# Patient Record
Sex: FEMALE | Race: WHITE | Employment: OTHER | URBAN - NONMETROPOLITAN AREA
[De-identification: names, ages, dates, MRNs, and addresses within clinical notes are randomized per-mention and may not be internally consistent; named-entity substitution may affect disease eponyms.]

---

## 2018-02-13 ENCOUNTER — HOSPITAL ENCOUNTER (EMERGENCY)
Age: 53
Discharge: HOME | End: 2018-02-13
Payer: MEDICARE

## 2018-02-13 ENCOUNTER — APPOINTMENT (OUTPATIENT)
Dept: GENERAL RADIOLOGY | Age: 53
End: 2018-02-13
Payer: MEDICARE

## 2018-02-13 VITALS
WEIGHT: 132 LBS | OXYGEN SATURATION: 99 % | TEMPERATURE: 97.8 F | SYSTOLIC BLOOD PRESSURE: 120 MMHG | RESPIRATION RATE: 20 BRPM | HEART RATE: 83 BPM | DIASTOLIC BLOOD PRESSURE: 71 MMHG

## 2018-02-13 DIAGNOSIS — S60.222A CONTUSION OF LEFT HAND, INITIAL ENCOUNTER: Primary | ICD-10-CM

## 2018-02-13 PROCEDURE — 73130 X-RAY EXAM OF HAND: CPT

## 2018-02-13 PROCEDURE — 99283 EMERGENCY DEPT VISIT LOW MDM: CPT

## 2018-02-13 PROCEDURE — 6370000000 HC RX 637 (ALT 250 FOR IP): Performed by: PHYSICIAN ASSISTANT

## 2018-02-13 RX ORDER — TRAMADOL HYDROCHLORIDE 50 MG/1
50 TABLET ORAL ONCE
Status: COMPLETED | OUTPATIENT
Start: 2018-02-13 | End: 2018-02-13

## 2018-02-13 RX ORDER — IBUPROFEN 600 MG/1
600 TABLET ORAL EVERY 6 HOURS PRN
Qty: 30 TABLET | Refills: 0 | Status: SHIPPED | OUTPATIENT
Start: 2018-02-13

## 2018-02-13 RX ADMIN — TRAMADOL HYDROCHLORIDE 50 MG: 50 TABLET, FILM COATED ORAL at 13:26

## 2018-02-13 ASSESSMENT — ENCOUNTER SYMPTOMS
DIARRHEA: 0
EYE REDNESS: 0
SHORTNESS OF BREATH: 0
EYE DISCHARGE: 0
WHEEZING: 0
NAUSEA: 0
SORE THROAT: 0
VOMITING: 0
RHINORRHEA: 0
COLOR CHANGE: 0
BACK PAIN: 0
COUGH: 0
CONSTIPATION: 0
ABDOMINAL PAIN: 0

## 2018-02-13 ASSESSMENT — PAIN SCALES - GENERAL: PAINLEVEL_OUTOF10: 7

## 2018-02-13 ASSESSMENT — PAIN DESCRIPTION - PAIN TYPE: TYPE: ACUTE PAIN

## 2018-02-13 ASSESSMENT — PAIN DESCRIPTION - LOCATION: LOCATION: HAND

## 2018-02-13 ASSESSMENT — PAIN DESCRIPTION - ORIENTATION: ORIENTATION: LEFT

## 2018-02-13 ASSESSMENT — PAIN DESCRIPTION - DESCRIPTORS: DESCRIPTORS: THROBBING

## 2018-02-13 NOTE — ED TRIAGE NOTES
Patient presents to the ED with left hand injury that happened last night. Patient states that a freezer door fell off and hit her hand. Hand is swollen and is very painful.

## 2018-02-13 NOTE — ED PROVIDER NOTES
[facility] EMERGENCY DEPT      CHIEF COMPLAINT       Chief Complaint   Patient presents with    Hand Injury     left       Nurses Notes reviewed and I agree except as noted in the HPI. HISTORY OF PRESENT ILLNESS    Anne Mccoy is a 46 y.o. female who presents to the ED for evaluation of a left hand injury. The patient reports that last night, a freezer door fell onto her left hand. She states that this morning when she woke up, her left hand was bruised and swollen. She rates her pain at a 7/10 in severity and describes it as throbbing in character. She states that nothing makes her pain better or worse. She denies any numbness or weakness of her LUE, but she does report tingling in her third, fourth, and fifth fingers. She denies any left wrist pain, bruising, or swelling. She denies any additional injuries or areas of pain. Patient denies any further complaints at initial encounter. REVIEW OF SYSTEMS     Review of Systems   Constitutional: Negative for chills, fatigue and fever. HENT: Negative for congestion, ear pain, rhinorrhea and sore throat. Eyes: Negative for discharge and redness. Respiratory: Negative for cough, shortness of breath and wheezing. Cardiovascular: Negative for chest pain and palpitations. Gastrointestinal: Negative for abdominal pain, constipation, diarrhea, nausea and vomiting. Genitourinary: Negative for difficulty urinating, dysuria and vaginal discharge. Musculoskeletal: Positive for arthralgias (left hand) and joint swelling (left hand). Negative for back pain, myalgias, neck pain and neck stiffness. Skin: Negative for color change, pallor, rash and wound. Neurological: Negative for dizziness, syncope, weakness, light-headedness, numbness and headaches. Hematological: Negative for adenopathy. Psychiatric/Behavioral: Negative for agitation, confusion, dysphoric mood and suicidal ideas. The patient is not nervous/anxious.          PAST MEDICAL HISTORY    has a past medical history of Asthma; Bone disease; and Bronchitis, chronic. SURGICAL HISTORY      has a past surgical history that includes Hysterectomy. CURRENT MEDICATIONS       Previous Medications    ETODOLAC (LODINE XL) 400 MG SR TABLET    Take 1 tablet by mouth daily. OXYCODONE-ACETAMINOPHEN (PERCOCET) 5-325 MG PER TABLET    Take 1 tablet by mouth every 4 hours as needed. ALLERGIES     has No Known Allergies. FAMILY HISTORY     has no family status information on file. family history is not on file. SOCIAL HISTORY      reports that she has been smoking. She does not have any smokeless tobacco history on file. She reports that she drinks alcohol. She reports that she does not use drugs. PHYSICAL EXAM     INITIAL VITALS:  weight is 132 lb (59.9 kg). Her oral temperature is 97.8 °F (36.6 °C). Her blood pressure is 120/71 and her pulse is 83. Her respiration is 20 and oxygen saturation is 99%. Physical Exam   Constitutional: She is oriented to person, place, and time. She appears well-developed and well-nourished. No distress. HENT:   Head: Normocephalic and atraumatic. Right Ear: External ear normal.   Left Ear: External ear normal.   Nose: Nose normal.   Mouth/Throat: Oropharynx is clear and moist.   Eyes: Conjunctivae and EOM are normal. Pupils are equal, round, and reactive to light. Right eye exhibits no discharge. Left eye exhibits no discharge. No scleral icterus. Neck: Normal range of motion. Neck supple. Cardiovascular: Normal rate, regular rhythm, normal heart sounds and intact distal pulses. Exam reveals no gallop and no friction rub. No murmur heard. Pulses:       Radial pulses are 2+ on the right side, and 2+ on the left side. Capillary refill is less than 3 seconds. Pulmonary/Chest: Effort normal and breath sounds normal. No respiratory distress. She has no wheezes. She has no rales. She exhibits no tenderness. Abdominal: Soft.  She dislocation, or soft tissue abnormality. Within the department, the patient was treated with Tramadol for pain. I observed the patient's condition to improve during the duration of her stay. I explained my proposed course of treatment to the patient, and she was amenable to my decision. She was discharged home with a prescription for ibuprofen and instructions to ice and elevate her left hand as needed for pain and swelling relief, and she will return to the ED if her symptoms become more severe in nature, or otherwise change. CRITICAL CARE:   None    CONSULTS:  None    PROCEDURES:  None    FINAL IMPRESSION      1. Contusion of left hand, initial encounter          DISPOSITION/PLAN   I have given the patient strict written and verbal instructions about care at home, follow-up, and signs and symptoms of worsening of condition, and the patient did verbalize understanding of these instructions. Patient was discharged in stable condition. Will return if symptoms change or worsen, or for any sign or symptom deemed emergent by the patient or family members. Follow up as an outpatient or sooner if symptoms warrant. PATIENT REFERRED TO:  DR. Maddie Porter [facility] [address] [address]  [phone]    Call in 3 days  As needed, If symptoms worsen      DISCHARGE MEDICATIONS:  New Prescriptions    IBUPROFEN (ADVIL;MOTRIN) 600 MG TABLET    Take 1 tablet by mouth every 6 hours as needed for Pain       (Please note that portions of this note were completed with a voice recognition program.  Efforts were made to edit the dictations but occasionally words are mis-transcribed.)    Scribe:  Keo Steiner   2/13/18 1:31 PM Scribing for and in the presence of Floyd Orozco PA-C.     Signed by: Pauline Marie, 2/13/18 1:55 PM    Provider:  I personally performed the services described in the documentation, reviewed and edited the documentation which